# Patient Record
Sex: MALE | Race: WHITE | NOT HISPANIC OR LATINO
[De-identification: names, ages, dates, MRNs, and addresses within clinical notes are randomized per-mention and may not be internally consistent; named-entity substitution may affect disease eponyms.]

---

## 2019-02-26 ENCOUNTER — APPOINTMENT (OUTPATIENT)
Dept: SURGERY | Facility: CLINIC | Age: 69
End: 2019-02-26

## 2019-02-26 VITALS
BODY MASS INDEX: 25.33 KG/M2 | DIASTOLIC BLOOD PRESSURE: 82 MMHG | SYSTOLIC BLOOD PRESSURE: 134 MMHG | WEIGHT: 171 LBS | HEART RATE: 80 BPM | HEIGHT: 69 IN | TEMPERATURE: 97.5 F

## 2019-02-26 PROBLEM — Z00.00 ENCOUNTER FOR PREVENTIVE HEALTH EXAMINATION: Status: ACTIVE | Noted: 2019-02-26

## 2021-05-14 ENCOUNTER — RESULT REVIEW (OUTPATIENT)
Age: 71
End: 2021-05-14

## 2022-12-29 ENCOUNTER — NON-APPOINTMENT (OUTPATIENT)
Age: 72
End: 2022-12-29

## 2023-01-11 ENCOUNTER — OFFICE (OUTPATIENT)
Dept: URBAN - METROPOLITAN AREA CLINIC 122 | Facility: CLINIC | Age: 73
Setting detail: OPHTHALMOLOGY
End: 2023-01-11
Payer: COMMERCIAL

## 2023-01-11 ENCOUNTER — RX ONLY (RX ONLY)
Age: 73
End: 2023-01-11

## 2023-01-11 DIAGNOSIS — H35.40: ICD-10-CM

## 2023-01-11 DIAGNOSIS — H35.373: ICD-10-CM

## 2023-01-11 DIAGNOSIS — H26.493: ICD-10-CM

## 2023-01-11 DIAGNOSIS — Z96.1: ICD-10-CM

## 2023-01-11 DIAGNOSIS — H40.1131: ICD-10-CM

## 2023-01-11 DIAGNOSIS — H43.392: ICD-10-CM

## 2023-01-11 DIAGNOSIS — H04.123: ICD-10-CM

## 2023-01-11 PROCEDURE — 92012 INTRM OPH EXAM EST PATIENT: CPT | Performed by: OPHTHALMOLOGY

## 2023-01-11 PROCEDURE — 92083 EXTENDED VISUAL FIELD XM: CPT | Performed by: OPHTHALMOLOGY

## 2023-01-11 ASSESSMENT — REFRACTION_AUTOREFRACTION
OD_SPHERE: +1.00
OS_CYLINDER: -0.50
OS_AXIS: 51
OD_CYLINDER: -0.75
OD_AXIS: 135
OS_SPHERE: -1.75

## 2023-01-11 ASSESSMENT — REFRACTION_MANIFEST
OS_SPHERE: -8.25
OD_SPHERE: -7.50
OD_AXIS: 175
OS_AXIS: 110
OS_AXIS: 15
OS_VA1: 20/25
OS_ADD: +2.75
OS_CYLINDER: -0.50
OS_ADD: +2.75
OD_AXIS: 170
OS_AXIS: 15
OD_CYLINDER: -0.75
OD_CYLINDER: -0.75
OD_ADD: +2.75
OS_CYLINDER: -0.50
OS_CYLINDER: -0.50
OD_SPHERE: +1.00
OD_AXIS: 160
OD_CYLINDER: -0.75
OD_AXIS: 170
OS_VA1: 20/25-
OS_VA1: 20/25
OS_SPHERE: -2.00
OD_VA1: 20/25
OD_CYLINDER: -1.00
OS_SPHERE: -2.00
OD_VA1: 20/20
OS_AXIS: 180
OS_ADD: +2.75
OS_CYLINDER: -0.75
OD_SPHERE: +0.75
OS_SPHERE: -2.00
OD_AXIS: 100
OS_VA1: 20/40
OS_VA1: 20/20
OD_VA1: 20/20
OS_SPHERE: -1.50
OD_ADD: +2.75
OS_AXIS: 10
OD_VA1: 20/25
OS_CYLINDER: +0.50
OS_AXIS: 10
OS_SPHERE: -2.25
OD_SPHERE: +0.75
OD_SPHERE: +0.75
OD_CYLINDER: +1.00
OS_CYLINDER: -0.75
OS_VA1: 20/25
OD_ADD: +2.75

## 2023-01-11 ASSESSMENT — KERATOMETRY
OD_K2POWER_DIOPTERS: 44.50
OD_AXISANGLE_DEGREES: 86
OS_AXISANGLE_DEGREES: 79
OD_K1POWER_DIOPTERS: 43.75
OS_K1POWER_DIOPTERS: 44.25
OS_K2POWER_DIOPTERS: 45.00

## 2023-01-11 ASSESSMENT — TEAR BREAK UP TIME (TBUT)
OS_TBUT: 2+
OD_TBUT: 2+

## 2023-01-11 ASSESSMENT — REFRACTION_CURRENTRX
OS_CYLINDER: -0.75
OD_CYLINDER: -0.75
OD_SPHERE: +0.75
OS_AXIS: 15
OD_ADD: +2.75
OD_VPRISM_DIRECTION: PROGS
OS_SPHERE: -2.00
OS_ADD: +2.75
OS_VPRISM_DIRECTION: PROGS
OD_OVR_VA: 20/
OS_OVR_VA: 20/
OD_AXIS: 170

## 2023-01-11 ASSESSMENT — SPHEQUIV_DERIVED
OS_SPHEQUIV: -2.25
OS_SPHEQUIV: -2.5
OS_SPHEQUIV: -1.875
OD_SPHEQUIV: 0.375
OD_SPHEQUIV: 0.25
OS_SPHEQUIV: -2.25
OD_SPHEQUIV: 0.625
OS_SPHEQUIV: -2.375
OD_SPHEQUIV: 0.375
OD_SPHEQUIV: -7
OS_SPHEQUIV: -2
OD_SPHEQUIV: 0.625
OS_SPHEQUIV: -8

## 2023-01-11 ASSESSMENT — AXIALLENGTH_DERIVED
OD_AL: 23.1281
OS_AL: 24.0646
OS_AL: 26.6455
OD_AL: 23.2694
OS_AL: 24.1663
OS_AL: 24.0646
OD_AL: 23.2221
OD_AL: 23.1281
OS_AL: 23.9135
OS_AL: 23.9637
OS_AL: 24.1154
OD_AL: 23.2221
OD_AL: 26.3853

## 2023-01-11 ASSESSMENT — CONFRONTATIONAL VISUAL FIELD TEST (CVF)
OS_FINDINGS: FULL
OD_FINDINGS: FULL

## 2023-01-11 ASSESSMENT — VISUAL ACUITY
OS_BCVA: 20/25-2
OD_BCVA: 20/25-

## 2023-01-11 ASSESSMENT — PACHYMETRY
OD_CT_CORRECTION: -4
OS_CT_UM: 598
OD_CT_UM: 608
OS_CT_CORRECTION: -4

## 2023-01-11 ASSESSMENT — TONOMETRY
OS_IOP_MMHG: 21
OD_IOP_MMHG: 18

## 2023-04-04 PROBLEM — Z12.83 SCREENING EXAM FOR SKIN CANCER: Status: RESOLVED | Noted: 2023-04-04 | Resolved: 2023-04-14

## 2023-04-04 PROBLEM — D22.9 MULTIPLE BENIGN NEVI: Status: ACTIVE | Noted: 2023-04-04

## 2023-04-04 PROBLEM — L81.4 LENTIGINES: Status: ACTIVE | Noted: 2023-04-04

## 2023-04-05 ENCOUNTER — APPOINTMENT (OUTPATIENT)
Dept: DERMATOLOGY | Facility: CLINIC | Age: 73
End: 2023-04-05

## 2023-04-05 DIAGNOSIS — Z12.83 ENCOUNTER FOR SCREENING FOR MALIGNANT NEOPLASM OF SKIN: ICD-10-CM

## 2023-04-05 DIAGNOSIS — L81.4 OTHER MELANIN HYPERPIGMENTATION: ICD-10-CM

## 2023-04-05 DIAGNOSIS — D22.9 MELANOCYTIC NEVI, UNSPECIFIED: ICD-10-CM

## 2023-05-10 ENCOUNTER — RX ONLY (RX ONLY)
Age: 73
End: 2023-05-10

## 2023-05-10 ENCOUNTER — OFFICE (OUTPATIENT)
Dept: URBAN - METROPOLITAN AREA CLINIC 122 | Facility: CLINIC | Age: 73
Setting detail: OPHTHALMOLOGY
End: 2023-05-10
Payer: MEDICARE

## 2023-05-10 DIAGNOSIS — H40.1131: ICD-10-CM

## 2023-05-10 DIAGNOSIS — Z96.1: ICD-10-CM

## 2023-05-10 DIAGNOSIS — H01.131: ICD-10-CM

## 2023-05-10 DIAGNOSIS — H16.223: ICD-10-CM

## 2023-05-10 PROCEDURE — 92012 INTRM OPH EXAM EST PATIENT: CPT | Performed by: OPHTHALMOLOGY

## 2023-05-10 PROCEDURE — 92133 CPTRZD OPH DX IMG PST SGM ON: CPT | Performed by: OPHTHALMOLOGY

## 2023-05-10 ASSESSMENT — PACHYMETRY
OS_CT_CORRECTION: -4
OS_CT_UM: 598
OD_CT_UM: 608
OD_CT_CORRECTION: -4

## 2023-05-10 ASSESSMENT — REFRACTION_MANIFEST
OS_VA1: 20/20
OS_SPHERE: -2.00
OS_CYLINDER: -0.75
OD_VA1: 20/25
OD_AXIS: 170
OS_CYLINDER: -0.75
OD_ADD: +2.75
OD_VA1: 20/20
OS_VA1: 20/25
OD_SPHERE: -7.50
OS_VA1: 20/25-
OD_CYLINDER: -0.75
OD_VA1: 20/20
OS_VA1: 20/25
OS_CYLINDER: +0.50
OD_AXIS: 160
OD_CYLINDER: -0.75
OS_AXIS: 10
OD_AXIS: 175
OD_CYLINDER: +1.00
OS_ADD: +2.75
OD_ADD: +2.75
OS_CYLINDER: -0.50
OS_SPHERE: -2.00
OS_VA1: 20/25
OS_AXIS: 15
OS_ADD: +2.75
OD_CYLINDER: -1.00
OS_SPHERE: -1.50
OS_CYLINDER: -0.50
OD_SPHERE: +0.75
OS_ADD: +2.75
OS_VA1: 20/40
OS_AXIS: 15
OD_SPHERE: +0.75
OS_AXIS: 10
OD_AXIS: 170
OD_AXIS: 100
OS_CYLINDER: -0.50
OS_SPHERE: -8.25
OD_SPHERE: +0.75
OD_ADD: +2.75
OS_SPHERE: -2.00
OS_SPHERE: -2.25
OS_AXIS: 180
OD_VA1: 20/25
OD_SPHERE: +1.00
OS_AXIS: 110
OD_CYLINDER: -0.75

## 2023-05-10 ASSESSMENT — AXIALLENGTH_DERIVED
OD_AL: 23.2221
OD_AL: 23.1281
OD_AL: 23.1281
OS_AL: 24.0646
OS_AL: 24.0646
OS_AL: 24.1663
OS_AL: 24.1154
OS_AL: 26.6455
OD_AL: 26.3853
OS_AL: 23.9637
OD_AL: 23.2221
OD_AL: 23.2694
OS_AL: 23.9135

## 2023-05-10 ASSESSMENT — SPHEQUIV_DERIVED
OD_SPHEQUIV: 0.375
OD_SPHEQUIV: 0.625
OS_SPHEQUIV: -2.25
OD_SPHEQUIV: 0.375
OS_SPHEQUIV: -2.5
OS_SPHEQUIV: -2
OS_SPHEQUIV: -2.25
OS_SPHEQUIV: -1.875
OS_SPHEQUIV: -8
OD_SPHEQUIV: 0.625
OS_SPHEQUIV: -2.375
OD_SPHEQUIV: -7
OD_SPHEQUIV: 0.25

## 2023-05-10 ASSESSMENT — REFRACTION_CURRENTRX
OS_CYLINDER: -0.75
OS_ADD: +2.75
OS_VPRISM_DIRECTION: PROGS
OD_OVR_VA: 20/
OD_CYLINDER: -0.75
OD_SPHERE: +0.75
OS_AXIS: 15
OD_ADD: +2.75
OD_VPRISM_DIRECTION: PROGS
OS_OVR_VA: 20/
OS_SPHERE: -2.00
OD_AXIS: 170

## 2023-05-10 ASSESSMENT — VISUAL ACUITY
OD_BCVA: 20/25+
OS_BCVA: 20/30

## 2023-05-10 ASSESSMENT — REFRACTION_AUTOREFRACTION
OD_CYLINDER: -0.75
OS_AXIS: 51
OS_SPHERE: -1.75
OS_CYLINDER: -0.50
OD_AXIS: 135
OD_SPHERE: +1.00

## 2023-05-10 ASSESSMENT — KERATOMETRY
OD_K1POWER_DIOPTERS: 43.75
OS_K2POWER_DIOPTERS: 45.00
OS_K1POWER_DIOPTERS: 44.25
OD_K2POWER_DIOPTERS: 44.50
OD_AXISANGLE_DEGREES: 86
OS_AXISANGLE_DEGREES: 79

## 2023-05-10 ASSESSMENT — TEAR BREAK UP TIME (TBUT)
OS_TBUT: 2+
OD_TBUT: 2+

## 2023-05-10 ASSESSMENT — TONOMETRY
OD_IOP_MMHG: 19
OS_IOP_MMHG: 18

## 2023-05-10 ASSESSMENT — LID EXAM ASSESSMENTS: OD_COMMENTS: ERYTHEMA RUL

## 2023-06-09 ENCOUNTER — OFFICE (OUTPATIENT)
Dept: URBAN - METROPOLITAN AREA CLINIC 122 | Facility: CLINIC | Age: 73
Setting detail: OPHTHALMOLOGY
End: 2023-06-09
Payer: MEDICARE

## 2023-06-09 DIAGNOSIS — H16.223: ICD-10-CM

## 2023-06-09 DIAGNOSIS — Z96.1: ICD-10-CM

## 2023-06-09 DIAGNOSIS — H52.7: ICD-10-CM

## 2023-06-09 DIAGNOSIS — H40.1131: ICD-10-CM

## 2023-06-09 DIAGNOSIS — H01.131: ICD-10-CM

## 2023-06-09 DIAGNOSIS — H52.4: ICD-10-CM

## 2023-06-09 PROCEDURE — 92083 EXTENDED VISUAL FIELD XM: CPT | Performed by: OPHTHALMOLOGY

## 2023-06-09 PROCEDURE — 92015 DETERMINE REFRACTIVE STATE: CPT | Performed by: OPHTHALMOLOGY

## 2023-06-09 PROCEDURE — 92012 INTRM OPH EXAM EST PATIENT: CPT | Performed by: OPHTHALMOLOGY

## 2023-06-09 PROCEDURE — 92133 CPTRZD OPH DX IMG PST SGM ON: CPT | Performed by: OPHTHALMOLOGY

## 2023-06-09 ASSESSMENT — SPHEQUIV_DERIVED
OD_SPHEQUIV: 0.625
OS_SPHEQUIV: -2.25
OS_SPHEQUIV: -2.375
OS_SPHEQUIV: -8
OS_SPHEQUIV: -2
OD_SPHEQUIV: 0.375
OS_SPHEQUIV: -1.875
OD_SPHEQUIV: 0.625
OS_SPHEQUIV: -2.5
OD_SPHEQUIV: -7
OS_SPHEQUIV: -2.375
OD_SPHEQUIV: 0.25
OD_SPHEQUIV: 0.25

## 2023-06-09 ASSESSMENT — REFRACTION_MANIFEST
OD_AXIS: 170
OD_VA1: 20/25
OS_VA1: 20/40
OS_VA1: 20/20
OD_CYLINDER: -0.75
OS_VA1: 20/25
OD_AXIS: 100
OS_SPHERE: -2.00
OS_SPHERE: -2.00
OS_ADD: +2.75
OS_VA1: 20/25
OD_VA1: 20/25
OS_CYLINDER: +0.50
OD_CYLINDER: -1.00
OS_CYLINDER: -0.50
OD_AXIS: 170
OD_SPHERE: +1.00
OS_SPHERE: -1.50
OD_CYLINDER: -0.75
OD_CYLINDER: +1.00
OS_ADD: +2.75
OD_VA1: 20/25
OS_VA1: 20/20
OS_CYLINDER: -0.75
OS_ADD: +2.75
OS_VA1: 20/25-
OS_SPHERE: -2.00
OD_SPHERE: +0.75
OD_SPHERE: +0.75
OS_AXIS: 180
OD_AXIS: 160
OS_ADD: +2.75
OD_SPHERE: -7.50
OD_SPHERE: +0.75
OS_AXIS: 110
OD_ADD: +2.75
OS_CYLINDER: -0.75
OS_AXIS: 15
OS_SPHERE: -8.25
OD_ADD: +2.75
OD_AXIS: 170
OS_AXIS: 15
OD_VA1: 20/20
OD_ADD: +2.75
OS_CYLINDER: -0.50
OS_AXIS: 15
OS_AXIS: 10
OS_CYLINDER: -0.75
OD_CYLINDER: -1.00
OS_SPHERE: -2.25
OD_ADD: +2.75

## 2023-06-09 ASSESSMENT — REFRACTION_CURRENTRX
OS_AXIS: 15
OD_OVR_VA: 20/
OD_AXIS: 170
OS_ADD: +2.75
OS_SPHERE: -2.00
OD_ADD: +2.75
OS_VPRISM_DIRECTION: PROGS
OD_CYLINDER: -1.00
OD_VPRISM_DIRECTION: PROGS
OD_SPHERE: +0.75
OS_OVR_VA: 20/
OS_CYLINDER: -0.75

## 2023-06-09 ASSESSMENT — AXIALLENGTH_DERIVED
OS_AL: 26.6455
OD_AL: 23.2221
OS_AL: 24.0646
OS_AL: 23.9135
OD_AL: 23.1281
OD_AL: 23.2694
OS_AL: 24.1154
OS_AL: 23.9637
OS_AL: 24.1154
OD_AL: 23.1281
OS_AL: 24.1663
OD_AL: 23.2694
OD_AL: 26.3853

## 2023-06-09 ASSESSMENT — REFRACTION_AUTOREFRACTION
OS_SPHERE: -1.75
OD_SPHERE: +1.00
OS_AXIS: 51
OD_CYLINDER: -0.75
OD_AXIS: 135
OS_CYLINDER: -0.50

## 2023-06-09 ASSESSMENT — TONOMETRY
OD_IOP_MMHG: 17
OS_IOP_MMHG: 19

## 2023-06-09 ASSESSMENT — KERATOMETRY
OD_AXISANGLE_DEGREES: 86
OD_K1POWER_DIOPTERS: 43.75
OS_AXISANGLE_DEGREES: 79
OD_K2POWER_DIOPTERS: 44.50
OS_K2POWER_DIOPTERS: 45.00
OS_K1POWER_DIOPTERS: 44.25

## 2023-06-09 ASSESSMENT — LID EXAM ASSESSMENTS: OD_COMMENTS: ERYTHEMA RUL

## 2023-06-09 ASSESSMENT — TEAR BREAK UP TIME (TBUT)
OD_TBUT: 2+
OS_TBUT: 2+

## 2023-06-09 ASSESSMENT — PACHYMETRY
OS_CT_UM: 598
OD_CT_CORRECTION: -4
OS_CT_CORRECTION: -4
OD_CT_UM: 608

## 2023-06-09 ASSESSMENT — VISUAL ACUITY
OS_BCVA: 20/25-
OD_BCVA: 20/20-

## 2023-06-09 ASSESSMENT — CONFRONTATIONAL VISUAL FIELD TEST (CVF)
OS_FINDINGS: FULL
OD_FINDINGS: FULL

## 2023-10-11 ENCOUNTER — RX ONLY (RX ONLY)
Age: 73
End: 2023-10-11

## 2023-10-11 ENCOUNTER — OFFICE (OUTPATIENT)
Dept: URBAN - METROPOLITAN AREA CLINIC 122 | Facility: CLINIC | Age: 73
Setting detail: OPHTHALMOLOGY
End: 2023-10-11
Payer: MEDICARE

## 2023-10-11 DIAGNOSIS — H35.373: ICD-10-CM

## 2023-10-11 DIAGNOSIS — H01.004: ICD-10-CM

## 2023-10-11 DIAGNOSIS — H35.40: ICD-10-CM

## 2023-10-11 DIAGNOSIS — H16.223: ICD-10-CM

## 2023-10-11 DIAGNOSIS — H26.493: ICD-10-CM

## 2023-10-11 DIAGNOSIS — Z96.1: ICD-10-CM

## 2023-10-11 DIAGNOSIS — H43.392: ICD-10-CM

## 2023-10-11 DIAGNOSIS — H40.1131: ICD-10-CM

## 2023-10-11 DIAGNOSIS — H01.001: ICD-10-CM

## 2023-10-11 PROBLEM — H02.821 LID LESION; RIGHT UPPER LID: Status: ACTIVE | Noted: 2023-10-11

## 2023-10-11 PROCEDURE — 92250 FUNDUS PHOTOGRAPHY W/I&R: CPT | Performed by: OPHTHALMOLOGY

## 2023-10-11 PROCEDURE — 92014 COMPRE OPH EXAM EST PT 1/>: CPT | Performed by: OPHTHALMOLOGY

## 2023-10-11 ASSESSMENT — REFRACTION_MANIFEST
OS_CYLINDER: -0.50
OS_ADD: +2.75
OD_AXIS: 170
OS_VA1: 20/25
OS_CYLINDER: -0.75
OD_SPHERE: +0.75
OD_VA1: 20/25
OS_VA1: 20/20
OS_VA1: 20/25-
OD_SPHERE: +0.75
OS_VA1: 20/40
OD_ADD: +2.75
OD_ADD: +2.75
OD_AXIS: 170
OS_CYLINDER: -0.50
OS_ADD: +2.75
OD_CYLINDER: -0.75
OD_ADD: +2.75
OS_VA1: 20/20
OD_VA1: 20/25
OD_AXIS: 170
OS_AXIS: 10
OD_VA1: 20/25
OS_CYLINDER: +0.50
OS_SPHERE: -2.25
OS_AXIS: 15
OD_CYLINDER: +1.00
OS_SPHERE: -8.25
OS_SPHERE: -2.00
OS_CYLINDER: -0.75
OS_SPHERE: -2.00
OS_ADD: +2.75
OD_SPHERE: +1.00
OD_VA1: 20/20
OS_AXIS: 15
OS_AXIS: 15
OD_CYLINDER: -1.00
OS_ADD: +2.75
OD_SPHERE: -7.50
OS_SPHERE: -1.50
OS_SPHERE: -2.00
OS_AXIS: 180
OD_CYLINDER: -1.00
OD_ADD: +2.75
OS_AXIS: 110
OS_CYLINDER: -0.75
OD_SPHERE: +0.75
OD_CYLINDER: -0.75
OD_AXIS: 100
OD_AXIS: 160
OS_VA1: 20/25

## 2023-10-11 ASSESSMENT — LID EXAM ASSESSMENTS
OS_BLEPHARITIS: LUL 2+
OD_BLEPHARITIS: RUL 2+

## 2023-10-11 ASSESSMENT — REFRACTION_CURRENTRX
OD_VPRISM_DIRECTION: PROGS
OS_CYLINDER: -0.75
OD_OVR_VA: 20/
OS_SPHERE: -2.00
OS_OVR_VA: 20/
OS_ADD: +2.75
OD_SPHERE: +0.75
OS_AXIS: 15
OD_ADD: +2.75
OD_CYLINDER: -1.00
OS_VPRISM_DIRECTION: PROGS
OD_AXIS: 170

## 2023-10-11 ASSESSMENT — SPHEQUIV_DERIVED
OD_SPHEQUIV: 0.625
OS_SPHEQUIV: -2.5
OD_SPHEQUIV: -7
OS_SPHEQUIV: -1.875
OS_SPHEQUIV: -2
OS_SPHEQUIV: -2.375
OD_SPHEQUIV: 0.25
OD_SPHEQUIV: 0.625
OS_SPHEQUIV: -2.25
OD_SPHEQUIV: 0.375
OD_SPHEQUIV: 0.25
OS_SPHEQUIV: -8
OS_SPHEQUIV: -2.375

## 2023-10-11 ASSESSMENT — KERATOMETRY
OS_K1POWER_DIOPTERS: 44.25
OS_K2POWER_DIOPTERS: 45.00
OD_K2POWER_DIOPTERS: 44.50
OS_AXISANGLE_DEGREES: 79
OD_K1POWER_DIOPTERS: 43.75
OD_AXISANGLE_DEGREES: 86

## 2023-10-11 ASSESSMENT — TONOMETRY
OD_IOP_MMHG: 16
OS_IOP_MMHG: 17

## 2023-10-11 ASSESSMENT — REFRACTION_AUTOREFRACTION
OS_SPHERE: -1.75
OD_AXIS: 135
OS_AXIS: 51
OD_CYLINDER: -0.75
OD_SPHERE: +1.00
OS_CYLINDER: -0.50

## 2023-10-11 ASSESSMENT — AXIALLENGTH_DERIVED
OD_AL: 26.3853
OS_AL: 24.1154
OS_AL: 24.1663
OD_AL: 23.2694
OS_AL: 24.0646
OD_AL: 23.2694
OD_AL: 23.2221
OS_AL: 26.6455
OS_AL: 23.9637
OS_AL: 23.9135
OS_AL: 24.1154
OD_AL: 23.1281
OD_AL: 23.1281

## 2023-10-11 ASSESSMENT — PACHYMETRY
OS_CT_CORRECTION: -4
OD_CT_CORRECTION: -4
OD_CT_UM: 608
OS_CT_UM: 598

## 2023-10-11 ASSESSMENT — VISUAL ACUITY
OD_BCVA: 20/25
OS_BCVA: 20/30

## 2023-10-11 ASSESSMENT — TEAR BREAK UP TIME (TBUT)
OS_TBUT: 2+
OD_TBUT: 2+

## 2023-10-20 ENCOUNTER — APPOINTMENT (OUTPATIENT)
Dept: PEDIATRIC ORTHOPEDIC SURGERY | Facility: CLINIC | Age: 73
End: 2023-10-20
Payer: MEDICARE

## 2023-10-20 VITALS — HEIGHT: 68 IN | WEIGHT: 170 LBS | BODY MASS INDEX: 25.76 KG/M2

## 2023-10-20 VITALS — TEMPERATURE: 96.7 F | SYSTOLIC BLOOD PRESSURE: 137 MMHG | DIASTOLIC BLOOD PRESSURE: 90 MMHG

## 2023-10-20 DIAGNOSIS — M19.012 PRIMARY OSTEOARTHRITIS, LEFT SHOULDER: ICD-10-CM

## 2023-10-20 DIAGNOSIS — M23.8X2 OTHER INTERNAL DERANGEMENTS OF LEFT KNEE: ICD-10-CM

## 2023-10-20 DIAGNOSIS — M25.812 OTHER SPECIFIED JOINT DISORDERS, LEFT SHOULDER: ICD-10-CM

## 2023-10-20 DIAGNOSIS — Z82.49 FAMILY HISTORY OF ISCHEMIC HEART DISEASE AND OTHER DISEASES OF THE CIRCULATORY SYSTEM: ICD-10-CM

## 2023-10-20 PROCEDURE — 73562 X-RAY EXAM OF KNEE 3: CPT

## 2023-10-20 PROCEDURE — 73030 X-RAY EXAM OF SHOULDER: CPT

## 2023-10-20 PROCEDURE — 99203 OFFICE O/P NEW LOW 30 MIN: CPT

## 2023-10-20 RX ORDER — ROSUVASTATIN CALCIUM 5 MG/1
TABLET, FILM COATED ORAL
Refills: 0 | Status: ACTIVE | COMMUNITY

## 2023-10-20 RX ORDER — DULOXETINE HYDROCHLORIDE 30 MG/1
CAPSULE, DELAYED RELEASE ORAL
Refills: 0 | Status: ACTIVE | COMMUNITY

## 2024-02-07 ENCOUNTER — OFFICE (OUTPATIENT)
Dept: URBAN - METROPOLITAN AREA CLINIC 122 | Facility: CLINIC | Age: 74
Setting detail: OPHTHALMOLOGY
End: 2024-02-07
Payer: MEDICARE

## 2024-02-07 DIAGNOSIS — H16.223: ICD-10-CM

## 2024-02-07 DIAGNOSIS — H26.493: ICD-10-CM

## 2024-02-07 DIAGNOSIS — H01.001: ICD-10-CM

## 2024-02-07 DIAGNOSIS — H35.373: ICD-10-CM

## 2024-02-07 DIAGNOSIS — H40.1131: ICD-10-CM

## 2024-02-07 DIAGNOSIS — Z96.1: ICD-10-CM

## 2024-02-07 DIAGNOSIS — H02.821: ICD-10-CM

## 2024-02-07 DIAGNOSIS — H43.392: ICD-10-CM

## 2024-02-07 DIAGNOSIS — H01.004: ICD-10-CM

## 2024-02-07 DIAGNOSIS — H35.40: ICD-10-CM

## 2024-02-07 PROCEDURE — 99214 OFFICE O/P EST MOD 30 MIN: CPT | Performed by: OPHTHALMOLOGY

## 2024-02-07 PROCEDURE — 92134 CPTRZ OPH DX IMG PST SGM RTA: CPT | Performed by: OPHTHALMOLOGY

## 2024-02-07 ASSESSMENT — REFRACTION_MANIFEST
OS_ADD: +2.75
OD_ADD: +2.75
OD_VA1: 20/25
OD_SPHERE: +0.75
OS_AXIS: 15
OS_VA1: 20/25
OD_CYLINDER: +1.00
OS_CYLINDER: -0.50
OS_ADD: +2.75
OS_SPHERE: -2.00
OS_VA1: 20/20
OD_SPHERE: -7.50
OS_SPHERE: -2.00
OD_SPHERE: +0.75
OD_CYLINDER: -0.75
OD_AXIS: 170
OS_AXIS: 180
OD_SPHERE: +0.75
OD_CYLINDER: -1.00
OD_VA1: 20/25
OS_ADD: +2.75
OD_CYLINDER: -1.00
OD_AXIS: 170
OD_VA1: 20/25
OD_AXIS: 170
OS_ADD: +2.75
OS_VA1: 20/20
OS_VA1: 20/25
OS_AXIS: 15
OS_CYLINDER: -0.50
OS_SPHERE: -8.25
OS_VA1: 20/40
OD_AXIS: 160
OS_SPHERE: -2.25
OS_AXIS: 110
OS_CYLINDER: -0.75
OS_CYLINDER: -0.75
OD_AXIS: 100
OD_ADD: +2.75
OS_VA1: 20/25-
OD_ADD: +2.75
OS_CYLINDER: -0.75
OS_AXIS: 10
OD_SPHERE: +1.00
OD_ADD: +2.75
OS_SPHERE: -2.00
OS_AXIS: 15
OS_SPHERE: -1.50
OD_VA1: 20/20
OS_CYLINDER: +0.50
OD_CYLINDER: -0.75

## 2024-02-07 ASSESSMENT — REFRACTION_AUTOREFRACTION
OS_AXIS: 51
OS_SPHERE: -1.75
OD_CYLINDER: -0.75
OD_SPHERE: +1.00
OD_AXIS: 135
OS_CYLINDER: -0.50

## 2024-02-07 ASSESSMENT — SPHEQUIV_DERIVED
OD_SPHEQUIV: 0.25
OS_SPHEQUIV: -2.375
OD_SPHEQUIV: 0.375
OS_SPHEQUIV: -2.375
OD_SPHEQUIV: 0.625
OD_SPHEQUIV: 0.25
OD_SPHEQUIV: 0.625
OS_SPHEQUIV: -2.5
OS_SPHEQUIV: -1.875
OS_SPHEQUIV: -2.25
OS_SPHEQUIV: -2
OD_SPHEQUIV: -7
OS_SPHEQUIV: -8

## 2024-02-07 ASSESSMENT — REFRACTION_CURRENTRX
OD_SPHERE: +0.75
OS_VPRISM_DIRECTION: PROGS
OS_CYLINDER: -0.75
OS_ADD: +2.75
OD_AXIS: 170
OS_OVR_VA: 20/
OD_VPRISM_DIRECTION: PROGS
OD_OVR_VA: 20/
OD_CYLINDER: -1.00
OS_AXIS: 15
OD_ADD: +2.75
OS_SPHERE: -2.00

## 2024-02-07 ASSESSMENT — CONFRONTATIONAL VISUAL FIELD TEST (CVF)
OD_FINDINGS: FULL
OS_FINDINGS: FULL

## 2024-02-07 ASSESSMENT — LID EXAM ASSESSMENTS
OS_BLEPHARITIS: LUL 2+
OD_BLEPHARITIS: RUL 2+

## 2024-02-07 ASSESSMENT — TEAR BREAK UP TIME (TBUT)
OD_TBUT: 2+
OS_TBUT: 2+

## 2024-02-28 ENCOUNTER — RX ONLY (RX ONLY)
Age: 74
End: 2024-02-28

## 2024-02-28 ENCOUNTER — OFFICE (OUTPATIENT)
Dept: URBAN - METROPOLITAN AREA CLINIC 122 | Facility: CLINIC | Age: 74
Setting detail: OPHTHALMOLOGY
End: 2024-02-28
Payer: MEDICARE

## 2024-02-28 DIAGNOSIS — H26.491: ICD-10-CM

## 2024-02-28 PROBLEM — H26.492 POSTERIOR CAPSULAR OPACIFICATION; RIGHT EYE, LEFT EYE, BOTH EYES: Status: ACTIVE | Noted: 2024-02-28

## 2024-02-28 PROBLEM — H26.493 POSTERIOR CAPSULAR OPACIFICATION; RIGHT EYE, LEFT EYE, BOTH EYES: Status: ACTIVE | Noted: 2024-02-28

## 2024-02-28 PROCEDURE — 66821 AFTER CATARACT LASER SURGERY: CPT | Mod: RT | Performed by: OPHTHALMOLOGY

## 2024-02-28 ASSESSMENT — REFRACTION_MANIFEST
OS_VA1: 20/40
OS_SPHERE: -2.00
OD_VA1: 20/25
OD_CYLINDER: -1.00
OD_SPHERE: +0.75
OS_VA1: 20/25-
OD_SPHERE: +1.00
OS_VA1: 20/20
OS_SPHERE: -2.00
OS_AXIS: 110
OD_AXIS: 170
OS_CYLINDER: -0.75
OD_ADD: +2.75
OS_AXIS: 15
OS_ADD: +2.75
OS_CYLINDER: -0.75
OS_SPHERE: -2.00
OS_CYLINDER: -0.50
OD_VA1: 20/20
OD_VA1: 20/25
OD_SPHERE: -7.50
OD_ADD: +2.75
OS_VA1: 20/25
OD_AXIS: 160
OS_ADD: +2.75
OS_ADD: +2.75
OS_SPHERE: -1.50
OS_AXIS: 180
OS_AXIS: 15
OD_AXIS: 170
OS_SPHERE: -2.25
OS_CYLINDER: -0.75
OS_VA1: 20/25
OD_AXIS: 170
OD_ADD: +2.75
OS_AXIS: 10
OD_CYLINDER: -1.00
OD_CYLINDER: -0.75
OD_CYLINDER: -0.75
OD_CYLINDER: +1.00
OD_ADD: +2.75
OS_CYLINDER: -0.50
OS_SPHERE: -8.25
OD_SPHERE: +0.75
OS_ADD: +2.75
OS_CYLINDER: +0.50
OS_AXIS: 15
OD_VA1: 20/25
OD_AXIS: 100
OD_SPHERE: +0.75
OS_VA1: 20/20

## 2024-02-28 ASSESSMENT — REFRACTION_CURRENTRX
OD_ADD: +2.75
OS_CYLINDER: -0.75
OD_OVR_VA: 20/
OD_CYLINDER: -1.00
OS_OVR_VA: 20/
OS_AXIS: 15
OD_SPHERE: +0.75
OS_SPHERE: -2.00
OS_ADD: +2.75
OD_VPRISM_DIRECTION: PROGS
OS_VPRISM_DIRECTION: PROGS
OD_AXIS: 170

## 2024-02-28 ASSESSMENT — SPHEQUIV_DERIVED
OS_SPHEQUIV: -2.25
OD_SPHEQUIV: 0.25
OD_SPHEQUIV: 0.625
OS_SPHEQUIV: -8
OD_SPHEQUIV: -7
OS_SPHEQUIV: -1.875
OS_SPHEQUIV: -2
OS_SPHEQUIV: -2.5
OS_SPHEQUIV: -2.375
OD_SPHEQUIV: 0.625
OS_SPHEQUIV: -2.375
OD_SPHEQUIV: 0.375
OD_SPHEQUIV: 0.25

## 2024-02-28 ASSESSMENT — REFRACTION_AUTOREFRACTION
OS_SPHERE: -1.75
OS_AXIS: 51
OD_AXIS: 135
OD_CYLINDER: -0.75
OS_CYLINDER: -0.50
OD_SPHERE: +1.00

## 2024-02-28 ASSESSMENT — TEAR BREAK UP TIME (TBUT)
OD_TBUT: 2+
OS_TBUT: 2+

## 2024-03-28 ENCOUNTER — APPOINTMENT (OUTPATIENT)
Dept: UROLOGY | Facility: CLINIC | Age: 74
End: 2024-03-28
Payer: MEDICARE

## 2024-03-28 VITALS
SYSTOLIC BLOOD PRESSURE: 123 MMHG | HEART RATE: 69 BPM | HEIGHT: 68 IN | BODY MASS INDEX: 26.98 KG/M2 | WEIGHT: 178 LBS | DIASTOLIC BLOOD PRESSURE: 74 MMHG

## 2024-03-28 DIAGNOSIS — R97.20 ELEVATED PROSTATE, SPECIFIC ANTIGEN [PSA]: ICD-10-CM

## 2024-03-28 DIAGNOSIS — Z12.5 ENCOUNTER FOR SCREENING FOR MALIGNANT NEOPLASM OF PROSTATE: ICD-10-CM

## 2024-03-28 DIAGNOSIS — N52.9 MALE ERECTILE DYSFUNCTION, UNSPECIFIED: ICD-10-CM

## 2024-03-28 PROCEDURE — 99203 OFFICE O/P NEW LOW 30 MIN: CPT

## 2024-03-28 NOTE — ASSESSMENT
[FreeTextEntry1] : Patient is a 73-year-old male who presents today for right-sided PSA.  His PCP noted a rise in PSA from about 1.6-3.3 over the last calendar year.  He denies associated hematuria, dysuria, hematospermia or pelvic pain.  He has no family history of prostate cancer.  His physical exam today is normal.  In addition, he takes sildenafil 100 mg as needed for erectile dysfunction.  Assessment and plan 1.  Rising PSA CELY is notable and will recheck PSA today.  If still in the 3 light range recommend getting an MRI of the prostate.  To evaluate for lesions and possible biopsy.  2.  Erectile dysfunction Continue sildenafil 100 mg as needed. I will call him with PSA results.

## 2024-04-01 LAB — PSA SERPL-MCNC: 1.23 NG/ML

## 2024-04-09 ENCOUNTER — OFFICE (OUTPATIENT)
Dept: URBAN - METROPOLITAN AREA CLINIC 122 | Facility: CLINIC | Age: 74
Setting detail: OPHTHALMOLOGY
End: 2024-04-09
Payer: MEDICARE

## 2024-04-09 ENCOUNTER — RX ONLY (RX ONLY)
Age: 74
End: 2024-04-09

## 2024-04-09 DIAGNOSIS — H26.492: ICD-10-CM

## 2024-04-09 PROCEDURE — 66821 AFTER CATARACT LASER SURGERY: CPT | Mod: 79,LT | Performed by: OPHTHALMOLOGY

## 2024-04-23 ENCOUNTER — OFFICE (OUTPATIENT)
Dept: URBAN - METROPOLITAN AREA CLINIC 122 | Facility: CLINIC | Age: 74
Setting detail: OPHTHALMOLOGY
End: 2024-04-23
Payer: MEDICARE

## 2024-04-23 DIAGNOSIS — H40.1131: ICD-10-CM

## 2024-04-23 DIAGNOSIS — L30.8: ICD-10-CM

## 2024-04-23 DIAGNOSIS — H01.001: ICD-10-CM

## 2024-04-23 DIAGNOSIS — H01.004: ICD-10-CM

## 2024-04-23 PROCEDURE — 99213 OFFICE O/P EST LOW 20 MIN: CPT | Mod: 24 | Performed by: OPHTHALMOLOGY

## 2024-04-23 ASSESSMENT — LID EXAM ASSESSMENTS
OD_BLEPHARITIS: RUL 2+
OS_BLEPHARITIS: LUL 2+

## 2024-05-09 ENCOUNTER — OFFICE (OUTPATIENT)
Dept: URBAN - METROPOLITAN AREA CLINIC 122 | Facility: CLINIC | Age: 74
Setting detail: OPHTHALMOLOGY
End: 2024-05-09
Payer: MEDICARE

## 2024-05-09 DIAGNOSIS — L30.8: ICD-10-CM

## 2024-05-09 DIAGNOSIS — H40.1131: ICD-10-CM

## 2024-05-09 DIAGNOSIS — H01.004: ICD-10-CM

## 2024-05-09 DIAGNOSIS — H01.001: ICD-10-CM

## 2024-05-09 PROCEDURE — 92083 EXTENDED VISUAL FIELD XM: CPT | Mod: 79 | Performed by: OPHTHALMOLOGY

## 2024-05-09 PROCEDURE — 99213 OFFICE O/P EST LOW 20 MIN: CPT | Mod: 24 | Performed by: OPHTHALMOLOGY

## 2024-05-09 PROCEDURE — 92133 CPTRZD OPH DX IMG PST SGM ON: CPT | Mod: 79 | Performed by: OPHTHALMOLOGY

## 2024-05-09 ASSESSMENT — LID EXAM ASSESSMENTS
OS_BLEPHARITIS: LUL 2+
OD_BLEPHARITIS: RUL 2+

## 2024-05-09 ASSESSMENT — CONFRONTATIONAL VISUAL FIELD TEST (CVF)
OS_FINDINGS: FULL
OD_FINDINGS: FULL

## 2024-09-13 ENCOUNTER — OFFICE (OUTPATIENT)
Dept: URBAN - METROPOLITAN AREA CLINIC 122 | Facility: CLINIC | Age: 74
Setting detail: OPHTHALMOLOGY
End: 2024-09-13
Payer: MEDICARE

## 2024-09-13 DIAGNOSIS — H01.001: ICD-10-CM

## 2024-09-13 DIAGNOSIS — H52.7: ICD-10-CM

## 2024-09-13 DIAGNOSIS — L30.8: ICD-10-CM

## 2024-09-13 DIAGNOSIS — H26.493: ICD-10-CM

## 2024-09-13 DIAGNOSIS — H40.1131: ICD-10-CM

## 2024-09-13 PROCEDURE — 92014 COMPRE OPH EXAM EST PT 1/>: CPT | Performed by: OPHTHALMOLOGY

## 2024-09-13 PROCEDURE — 92250 FUNDUS PHOTOGRAPHY W/I&R: CPT | Performed by: OPHTHALMOLOGY

## 2024-09-13 PROCEDURE — 92015 DETERMINE REFRACTIVE STATE: CPT | Performed by: OPHTHALMOLOGY

## 2024-09-13 ASSESSMENT — CONFRONTATIONAL VISUAL FIELD TEST (CVF)
OS_FINDINGS: FULL
OD_FINDINGS: FULL

## 2024-09-13 ASSESSMENT — LID EXAM ASSESSMENTS
OS_BLEPHARITIS: LUL 2+
OD_BLEPHARITIS: RUL 2+

## 2024-10-11 ENCOUNTER — OFFICE (OUTPATIENT)
Dept: URBAN - METROPOLITAN AREA CLINIC 122 | Facility: CLINIC | Age: 74
Setting detail: OPHTHALMOLOGY
End: 2024-10-11
Payer: MEDICARE

## 2024-10-11 DIAGNOSIS — H43.392: ICD-10-CM

## 2024-10-11 DIAGNOSIS — H35.373: ICD-10-CM

## 2024-10-11 DIAGNOSIS — L30.8: ICD-10-CM

## 2024-10-11 DIAGNOSIS — H35.40: ICD-10-CM

## 2024-10-11 DIAGNOSIS — H16.223: ICD-10-CM

## 2024-10-11 DIAGNOSIS — H40.1131: ICD-10-CM

## 2024-10-11 DIAGNOSIS — H26.491: ICD-10-CM

## 2024-10-11 DIAGNOSIS — Z96.1: ICD-10-CM

## 2024-10-11 DIAGNOSIS — H01.004: ICD-10-CM

## 2024-10-11 DIAGNOSIS — H26.493: ICD-10-CM

## 2024-10-11 DIAGNOSIS — H01.001: ICD-10-CM

## 2024-10-11 DIAGNOSIS — H26.492: ICD-10-CM

## 2024-10-11 PROCEDURE — 92012 INTRM OPH EXAM EST PATIENT: CPT | Performed by: OPHTHALMOLOGY

## 2024-10-11 PROCEDURE — 92133 CPTRZD OPH DX IMG PST SGM ON: CPT | Performed by: OPHTHALMOLOGY

## 2024-10-11 ASSESSMENT — REFRACTION_MANIFEST
OS_CYLINDER: +0.50
OS_AXIS: 180
OD_AXIS: 160
OS_CYLINDER: -0.75
OS_VA1: 20/25-
OS_SPHERE: -1.50
OD_VA1: 20/20
OD_SPHERE: +0.75
OS_VA1: 20/40
OD_VA1: 20/25
OD_AXIS: 170
OS_CYLINDER: -0.75
OS_AXIS: 15
OD_SPHERE: +0.75
OS_SPHERE: -2.25
OD_VA1: 20/20
OD_CYLINDER: -1.00
OD_ADD: +2.75
OS_AXIS: 10
OS_VA1: 20/25
OD_CYLINDER: -0.75
OD_CYLINDER: -0.75
OS_AXIS: 110
OS_CYLINDER: -0.75
OD_ADD: +2.75
OD_AXIS: 170
OS_CYLINDER: -0.50
OS_SPHERE: -2.00
OS_VA1: 20/20
OS_VA1: 20/20
OS_ADD: +2.75
OS_ADD: +2.75
OS_SPHERE: -1.75
OD_CYLINDER: +1.00
OS_CYLINDER: SPH
OD_AXIS: 100
OS_SPHERE: -8.25
OD_SPHERE: -7.50
OS_AXIS: 15
OS_AXIS: 15
OS_VA1: 20/20
OS_SPHERE: -2.00
OS_SPHERE: -2.00
OS_ADD: +2.75
OD_VA1: 20/25
OS_CYLINDER: -0.50
OD_CYLINDER: -0.75
OS_ADD: +2.75
OD_ADD: +2.75
OD_AXIS: 170
OS_VA1: 20/25
OD_CYLINDER: -1.00
OD_ADD: +2.75
OS_ADD: +2.75
OD_VA1: 20/25
OD_SPHERE: +0.75
OD_AXIS: 150
OD_SPHERE: +1.00
OD_ADD: +2.75
OD_SPHERE: +0.75

## 2024-10-11 ASSESSMENT — REFRACTION_AUTOREFRACTION
OS_CYLINDER: SPH
OD_AXIS: 149
OD_SPHERE: +0.75
OD_CYLINDER: -0.75
OS_SPHERE: -1.75

## 2024-10-11 ASSESSMENT — REFRACTION_CURRENTRX
OD_ADD: +2.25
OS_OVR_VA: 20/
OS_VPRISM_DIRECTION: PROGS
OS_CYLINDER: -0.50
OS_SPHERE: -1.75
OD_CYLINDER: -1.00
OS_ADD: +2.25
OD_AXIS: 170
OD_OVR_VA: 20/
OS_AXIS: 177
OD_SPHERE: +0.75
OD_VPRISM_DIRECTION: PROGS

## 2024-10-11 ASSESSMENT — KERATOMETRY
OS_K2POWER_DIOPTERS: 45.00
OD_K1POWER_DIOPTERS: 43.75
OS_AXISANGLE_DEGREES: 79
OD_K2POWER_DIOPTERS: 44.50
OS_K1POWER_DIOPTERS: 44.25
OD_AXISANGLE_DEGREES: 86

## 2024-10-11 ASSESSMENT — TEAR BREAK UP TIME (TBUT)
OS_TBUT: 2+
OD_TBUT: 2+

## 2024-10-11 ASSESSMENT — LID EXAM ASSESSMENTS
OD_BLEPHARITIS: RUL 2+
OS_BLEPHARITIS: LUL 2+

## 2024-10-11 ASSESSMENT — PACHYMETRY
OD_CT_UM: 608
OS_CT_UM: 598
OS_CT_CORRECTION: -4
OD_CT_CORRECTION: -4

## 2024-10-11 ASSESSMENT — CONFRONTATIONAL VISUAL FIELD TEST (CVF)
OS_FINDINGS: FULL
OD_FINDINGS: FULL

## 2024-10-11 ASSESSMENT — VISUAL ACUITY
OD_BCVA: 20/25+2
OS_BCVA: 20/20

## 2024-10-11 ASSESSMENT — TONOMETRY
OD_IOP_MMHG: 16
OS_IOP_MMHG: 17

## 2024-11-08 ENCOUNTER — APPOINTMENT (OUTPATIENT)
Dept: NEPHROLOGY | Facility: CLINIC | Age: 74
End: 2024-11-08
Payer: MEDICARE

## 2024-11-08 VITALS
BODY MASS INDEX: 27.28 KG/M2 | OXYGEN SATURATION: 98 % | HEART RATE: 73 BPM | WEIGHT: 180 LBS | DIASTOLIC BLOOD PRESSURE: 70 MMHG | SYSTOLIC BLOOD PRESSURE: 110 MMHG | HEIGHT: 68 IN

## 2024-11-08 DIAGNOSIS — N17.9 ACUTE KIDNEY FAILURE, UNSPECIFIED: ICD-10-CM

## 2024-11-08 PROCEDURE — 99204 OFFICE O/P NEW MOD 45 MIN: CPT

## 2024-11-11 LAB
ALBUMIN SERPL ELPH-MCNC: 4.4 G/DL
ALP BLD-CCNC: 60 U/L
ALT SERPL-CCNC: 29 U/L
ANION GAP SERPL CALC-SCNC: 11 MMOL/L
APPEARANCE: CLEAR
AST SERPL-CCNC: 44 U/L
BACTERIA: NEGATIVE /HPF
BILIRUB SERPL-MCNC: 0.3 MG/DL
BILIRUBIN URINE: NEGATIVE
BLOOD URINE: NEGATIVE
BUN SERPL-MCNC: 26 MG/DL
CALCIUM SERPL-MCNC: 9.6 MG/DL
CAST: 0 /LPF
CHLORIDE SERPL-SCNC: 105 MMOL/L
CK SERPL-CCNC: 140 U/L
CO2 SERPL-SCNC: 27 MMOL/L
COLOR: NORMAL
CREAT SERPL-MCNC: 1.43 MG/DL
CYSTATIN C SERPL-MCNC: 1.05 MG/L
EGFR: 52 ML/MIN/1.73M2
EPITHELIAL CELLS: 0 /HPF
GFR/BSA.PRED SERPLBLD CYS-BASED-ARV: 69 ML/MIN/1.73M2
GLUCOSE QUALITATIVE U: NEGATIVE MG/DL
GLUCOSE SERPL-MCNC: 95 MG/DL
HCT VFR BLD CALC: 47.9 %
HGB BLD-MCNC: 15.7 G/DL
KETONES URINE: NEGATIVE MG/DL
LEUKOCYTE ESTERASE URINE: NEGATIVE
MCHC RBC-ENTMCNC: 29.3 PG
MCHC RBC-ENTMCNC: 32.8 G/DL
MCV RBC AUTO: 89.4 FL
MICROSCOPIC-UA: NORMAL
NITRITE URINE: NEGATIVE
PH URINE: 7
PLATELET # BLD AUTO: 251 K/UL
POTASSIUM SERPL-SCNC: 4.7 MMOL/L
PROT SERPL-MCNC: 6.8 G/DL
PROTEIN URINE: NORMAL MG/DL
RBC # BLD: 5.36 M/UL
RBC # FLD: 14.6 %
RED BLOOD CELLS URINE: 0 /HPF
SODIUM SERPL-SCNC: 143 MMOL/L
SPECIFIC GRAVITY URINE: 1.02
URATE SERPL-MCNC: 8.5 MG/DL
UROBILINOGEN URINE: 0.2 MG/DL
WBC # FLD AUTO: 6.56 K/UL
WHITE BLOOD CELLS URINE: 2 /HPF

## 2024-11-22 ENCOUNTER — TRANSCRIPTION ENCOUNTER (OUTPATIENT)
Age: 74
End: 2024-11-22

## 2024-12-26 ENCOUNTER — RESULT REVIEW (OUTPATIENT)
Age: 74
End: 2024-12-26

## 2024-12-30 ENCOUNTER — APPOINTMENT (OUTPATIENT)
Dept: NEPHROLOGY | Facility: CLINIC | Age: 74
End: 2024-12-30
Payer: MEDICARE

## 2024-12-30 ENCOUNTER — RESULT REVIEW (OUTPATIENT)
Age: 74
End: 2024-12-30

## 2024-12-30 DIAGNOSIS — Z00.00 ENCOUNTER FOR GENERAL ADULT MEDICAL EXAMINATION W/OUT ABNORMAL FINDINGS: ICD-10-CM

## 2024-12-30 DIAGNOSIS — Z12.5 ENCOUNTER FOR SCREENING FOR MALIGNANT NEOPLASM OF PROSTATE: ICD-10-CM

## 2024-12-30 DIAGNOSIS — R97.20 ELEVATED PROSTATE, SPECIFIC ANTIGEN [PSA]: ICD-10-CM

## 2024-12-30 PROCEDURE — 36415 COLL VENOUS BLD VENIPUNCTURE: CPT

## 2025-01-06 ENCOUNTER — APPOINTMENT (OUTPATIENT)
Dept: NEPHROLOGY | Facility: CLINIC | Age: 75
End: 2025-01-06
Payer: MEDICARE

## 2025-01-06 VITALS
HEART RATE: 74 BPM | DIASTOLIC BLOOD PRESSURE: 70 MMHG | SYSTOLIC BLOOD PRESSURE: 122 MMHG | BODY MASS INDEX: 28.19 KG/M2 | HEIGHT: 68 IN | WEIGHT: 186 LBS | OXYGEN SATURATION: 98 %

## 2025-01-06 DIAGNOSIS — N17.9 ACUTE KIDNEY FAILURE, UNSPECIFIED: ICD-10-CM

## 2025-01-06 PROCEDURE — 99213 OFFICE O/P EST LOW 20 MIN: CPT

## 2025-02-05 ENCOUNTER — OFFICE (OUTPATIENT)
Dept: URBAN - METROPOLITAN AREA CLINIC 122 | Facility: CLINIC | Age: 75
Setting detail: OPHTHALMOLOGY
End: 2025-02-05
Payer: MEDICARE

## 2025-02-05 DIAGNOSIS — H35.40: ICD-10-CM

## 2025-02-05 DIAGNOSIS — Z96.1: ICD-10-CM

## 2025-02-05 DIAGNOSIS — H26.493: ICD-10-CM

## 2025-02-05 DIAGNOSIS — H40.1131: ICD-10-CM

## 2025-02-05 DIAGNOSIS — H01.001: ICD-10-CM

## 2025-02-05 DIAGNOSIS — H26.491: ICD-10-CM

## 2025-02-05 DIAGNOSIS — H01.004: ICD-10-CM

## 2025-02-05 DIAGNOSIS — H26.492: ICD-10-CM

## 2025-02-05 DIAGNOSIS — H16.223: ICD-10-CM

## 2025-02-05 DIAGNOSIS — L30.8: ICD-10-CM

## 2025-02-05 PROCEDURE — 92083 EXTENDED VISUAL FIELD XM: CPT | Performed by: OPHTHALMOLOGY

## 2025-02-05 PROCEDURE — 92012 INTRM OPH EXAM EST PATIENT: CPT | Performed by: OPHTHALMOLOGY

## 2025-02-05 ASSESSMENT — REFRACTION_MANIFEST
OD_CYLINDER: +1.00
OS_VA1: 20/25
OD_ADD: +2.75
OS_AXIS: 180
OD_AXIS: 170
OD_SPHERE: +1.00
OD_ADD: +2.75
OS_ADD: +2.75
OS_ADD: +2.75
OS_CYLINDER: +0.50
OS_ADD: +2.75
OS_CYLINDER: -0.75
OD_CYLINDER: -0.75
OS_AXIS: 15
OD_CYLINDER: -0.75
OS_SPHERE: -2.25
OD_AXIS: 160
OD_SPHERE: -7.50
OS_ADD: +2.75
OS_VA1: 20/40
OS_AXIS: 110
OS_SPHERE: -2.00
OS_AXIS: 10
OS_VA1: 20/25
OD_VA1: 20/25
OD_SPHERE: +0.75
OS_VA1: 20/20
OD_AXIS: 150
OD_SPHERE: +0.75
OD_VA1: 20/20
OS_SPHERE: -2.00
OS_CYLINDER: -0.75
OS_VA1: 20/20
OS_SPHERE: -1.75
OS_SPHERE: -8.25
OD_AXIS: 100
OS_CYLINDER: -0.50
OS_VA1: 20/25-
OD_VA1: 20/25
OD_CYLINDER: -1.00
OD_VA1: 20/25
OS_ADD: +2.75
OD_CYLINDER: -1.00
OD_ADD: +2.75
OS_CYLINDER: SPH
OS_VA1: 20/20
OS_SPHERE: -1.50
OD_SPHERE: +0.75
OS_CYLINDER: -0.75
OS_CYLINDER: -0.50
OD_VA1: 20/20
OS_AXIS: 15
OD_ADD: +2.75
OD_ADD: +2.75
OD_CYLINDER: -0.75
OD_SPHERE: +0.75
OS_AXIS: 15
OS_SPHERE: -2.00

## 2025-02-05 ASSESSMENT — REFRACTION_CURRENTRX
OD_AXIS: 170
OD_VPRISM_DIRECTION: PROGS
OD_OVR_VA: 20/
OS_AXIS: 177
OS_CYLINDER: -0.50
OS_ADD: +2.25
OS_VPRISM_DIRECTION: PROGS
OD_CYLINDER: -1.00
OS_SPHERE: -1.75
OD_ADD: +2.25
OD_SPHERE: +0.75
OS_OVR_VA: 20/

## 2025-02-05 ASSESSMENT — LID EXAM ASSESSMENTS
OS_BLEPHARITIS: LUL 2+
OD_BLEPHARITIS: RUL 2+

## 2025-02-05 ASSESSMENT — REFRACTION_AUTOREFRACTION
OD_CYLINDER: -0.75
OD_SPHERE: +0.75
OD_AXIS: 149
OS_CYLINDER: SPH
OS_SPHERE: -1.75

## 2025-02-05 ASSESSMENT — PACHYMETRY
OS_CT_UM: 598
OD_CT_CORRECTION: -4
OS_CT_CORRECTION: -4
OD_CT_UM: 608

## 2025-02-05 ASSESSMENT — KERATOMETRY
OD_K2POWER_DIOPTERS: 44.50
OS_K2POWER_DIOPTERS: 45.00
OS_AXISANGLE_DEGREES: 79
OD_K1POWER_DIOPTERS: 43.75
OS_K1POWER_DIOPTERS: 44.25
OD_AXISANGLE_DEGREES: 86

## 2025-02-05 ASSESSMENT — VISUAL ACUITY
OD_BCVA: 20/25+2
OS_BCVA: 20/20

## 2025-02-05 ASSESSMENT — TEAR BREAK UP TIME (TBUT)
OD_TBUT: 2+
OS_TBUT: 2+

## 2025-04-22 ENCOUNTER — OFFICE (OUTPATIENT)
Dept: URBAN - METROPOLITAN AREA CLINIC 122 | Facility: CLINIC | Age: 75
Setting detail: OPHTHALMOLOGY
End: 2025-04-22
Payer: MEDICARE

## 2025-04-22 DIAGNOSIS — H35.40: ICD-10-CM

## 2025-04-22 DIAGNOSIS — H40.1131: ICD-10-CM

## 2025-04-22 DIAGNOSIS — L30.8: ICD-10-CM

## 2025-04-22 DIAGNOSIS — H01.001: ICD-10-CM

## 2025-04-22 DIAGNOSIS — H16.223: ICD-10-CM

## 2025-04-22 DIAGNOSIS — Z96.1: ICD-10-CM

## 2025-04-22 DIAGNOSIS — H01.004: ICD-10-CM

## 2025-04-22 DIAGNOSIS — H26.492: ICD-10-CM

## 2025-04-22 DIAGNOSIS — H26.491: ICD-10-CM

## 2025-04-22 DIAGNOSIS — H52.4: ICD-10-CM

## 2025-04-22 DIAGNOSIS — H26.493: ICD-10-CM

## 2025-04-22 PROCEDURE — 92012 INTRM OPH EXAM EST PATIENT: CPT | Performed by: OPHTHALMOLOGY

## 2025-04-22 PROCEDURE — 92015 DETERMINE REFRACTIVE STATE: CPT | Performed by: OPHTHALMOLOGY

## 2025-04-22 PROCEDURE — 92134 CPTRZ OPH DX IMG PST SGM RTA: CPT | Performed by: OPHTHALMOLOGY

## 2025-04-22 ASSESSMENT — REFRACTION_MANIFEST
OS_SPHERE: -2.00
OS_ADD: +2.75
OS_AXIS: 110
OS_AXIS: 15
OS_ADD: +2.75
OD_ADD: +2.75
OS_VA1: 20/25
OD_ADD: +2.75
OS_VA1: 20/20
OS_CYLINDER: +0.50
OS_VA1: 20/20
OD_VA1: 20/20
OD_AXIS: 170
OD_ADD: +2.75
OS_SPHERE: -1.50
OD_SPHERE: +0.75
OS_CYLINDER: SPH
OS_AXIS: 180
OS_VA1: 20/20
OS_CYLINDER: -0.50
OS_VA1: 20/20
OS_SPHERE: -2.25
OS_CYLINDER: -0.50
OD_CYLINDER: -0.75
OD_AXIS: 170
OD_VA1: 20/20
OD_SPHERE: +0.75
OD_SPHERE: +0.75
OD_VA1: 20/25
OS_SPHERE: -8.25
OS_ADD: +2.75
OD_CYLINDER: -0.75
OS_SPHERE: -1.75
OS_CYLINDER: -0.75
OS_ADD: +2.50
OD_AXIS: 170
OD_CYLINDER: -0.75
OS_SPHERE: -1.75
OD_SPHERE: +0.75
OS_ADD: +2.75
OS_CYLINDER: -0.75
OS_SPHERE: -2.00
OS_CYLINDER: -0.75
OS_CYLINDER: SPH
OS_VA1: 20/25-
OS_SPHERE: -2.00
OS_AXIS: 15
OS_AXIS: 10
OD_AXIS: 150
OD_CYLINDER: +1.00
OD_CYLINDER: -1.00
OS_VA1: 20/40
OD_SPHERE: +1.00
OD_CYLINDER: -0.75
OS_AXIS: 15
OD_ADD: +2.50
OD_VA1: 20/25
OD_CYLINDER: -1.00
OD_AXIS: 180
OD_AXIS: 100
OD_VA1: 20/25
OS_VA1: 20/25
OD_VA1: 20/20
OD_SPHERE: -7.50
OS_ADD: +2.75
OD_ADD: +2.75
OD_AXIS: 160
OD_SPHERE: +0.75
OD_ADD: +2.75

## 2025-04-22 ASSESSMENT — REFRACTION_CURRENTRX
OD_AXIS: 170
OS_AXIS: 177
OD_CYLINDER: -1.00
OD_OVR_VA: 20/
OS_SPHERE: -1.75
OS_ADD: +2.25
OD_VPRISM_DIRECTION: PROGS
OD_ADD: +2.25
OS_OVR_VA: 20/
OD_OVR_VA: 20/
OD_AXIS: 171
OD_ADD: +2.50
OS_CYLINDER: -0.50
OS_CYLINDER: -0.75
OD_SPHERE: +0.75
OS_AXIS: 5
OS_OVR_VA: 20/
OD_SPHERE: +0.75
OS_SPHERE: -2.00
OS_VPRISM_DIRECTION: PROGS
OD_CYLINDER: -1.00
OS_ADD: +2.50

## 2025-04-22 ASSESSMENT — KERATOMETRY
OS_AXISANGLE_DEGREES: 79
OS_K1POWER_DIOPTERS: 44.25
OS_K2POWER_DIOPTERS: 45.00
OD_AXISANGLE_DEGREES: 86
OD_K2POWER_DIOPTERS: 44.50
OD_K1POWER_DIOPTERS: 43.75

## 2025-04-22 ASSESSMENT — PACHYMETRY
OS_CT_UM: 598
OD_CT_CORRECTION: -4
OD_CT_UM: 608
OS_CT_CORRECTION: -4

## 2025-04-22 ASSESSMENT — TEAR BREAK UP TIME (TBUT)
OS_TBUT: 2+
OD_TBUT: 2+

## 2025-04-22 ASSESSMENT — TONOMETRY: OD_IOP_MMHG: 18

## 2025-04-22 ASSESSMENT — REFRACTION_AUTOREFRACTION
OD_AXIS: 1365
OS_SPHERE: -1.75
OD_CYLINDER: -0.75
OS_CYLINDER: SPH
OD_SPHERE: +0.75

## 2025-04-22 ASSESSMENT — LID EXAM ASSESSMENTS
OD_BLEPHARITIS: RUL 2+
OS_BLEPHARITIS: LUL 2+

## 2025-04-22 ASSESSMENT — VISUAL ACUITY
OS_BCVA: 20/20
OD_BCVA: 20/25+2

## 2025-07-16 ENCOUNTER — RESULT REVIEW (OUTPATIENT)
Age: 75
End: 2025-07-16

## 2025-07-16 ENCOUNTER — APPOINTMENT (OUTPATIENT)
Dept: NEPHROLOGY | Facility: CLINIC | Age: 75
End: 2025-07-16
Payer: MEDICARE

## 2025-07-16 VITALS
HEIGHT: 68 IN | SYSTOLIC BLOOD PRESSURE: 130 MMHG | HEART RATE: 71 BPM | WEIGHT: 181 LBS | OXYGEN SATURATION: 99 % | BODY MASS INDEX: 27.43 KG/M2 | DIASTOLIC BLOOD PRESSURE: 78 MMHG

## 2025-07-16 PROCEDURE — G2211 COMPLEX E/M VISIT ADD ON: CPT

## 2025-07-16 PROCEDURE — 99214 OFFICE O/P EST MOD 30 MIN: CPT

## 2025-07-17 RX ORDER — SODIUM POLYSTYRENE SULFONATE 4.1 MEQ/G
POWDER, FOR SUSPENSION ORAL; RECTAL
Qty: 45 | Refills: 0 | Status: ACTIVE | COMMUNITY
Start: 2025-07-17 | End: 1900-01-01

## 2025-07-18 PROBLEM — E87.5 HYPERKALEMIA: Status: ACTIVE | Noted: 2025-07-17

## 2025-07-22 ENCOUNTER — LABORATORY RESULT (OUTPATIENT)
Age: 75
End: 2025-07-22

## 2025-07-22 DIAGNOSIS — N39.0 URINARY TRACT INFECTION, SITE NOT SPECIFIED: ICD-10-CM

## 2025-07-22 LAB
ANION GAP SERPL CALC-SCNC: 15 MMOL/L
APPEARANCE: CLEAR
BACTERIA: NEGATIVE /HPF
BILIRUBIN URINE: ABNORMAL
BLOOD URINE: NEGATIVE
BUN SERPL-MCNC: 14 MG/DL
CALCIUM SERPL-MCNC: 9.9 MG/DL
CAST: 1 /LPF
CHLORIDE SERPL-SCNC: 101 MMOL/L
CHOLEST SERPL-MCNC: 201 MG/DL
CO2 SERPL-SCNC: 22 MMOL/L
COARSE GRANULAR CASTS: PRESENT
COLOR: NORMAL
CREAT SERPL-MCNC: 1.26 MG/DL
EGFRCR SERPLBLD CKD-EPI 2021: 60 ML/MIN/1.73M2
EPITHELIAL CELLS: 2 /HPF
GLUCOSE QUALITATIVE U: NEGATIVE MG/DL
GLUCOSE SERPL-MCNC: 95 MG/DL
HDLC SERPL-MCNC: 44 MG/DL
HYALINE CASTS: PRESENT
KETONES URINE: ABNORMAL MG/DL
LDLC SERPL-MCNC: 137 MG/DL
LEUKOCYTE ESTERASE URINE: ABNORMAL
Lab: PRESENT
MICROSCOPIC-UA: NORMAL
NITRITE URINE: NEGATIVE
NONHDLC SERPL-MCNC: 157 MG/DL
PH URINE: 5.5
POTASSIUM SERPL-SCNC: 4.1 MMOL/L
POTASSIUM SERPL-SCNC: 4.2 MMOL/L
PROTEIN URINE: NORMAL MG/DL
RED BLOOD CELLS URINE: NORMAL /HPF
REVIEW: NORMAL
SODIUM SERPL-SCNC: 138 MMOL/L
SPECIFIC GRAVITY URINE: 1.02
TRANSITIONAL EPITHELIAL CELLS: PRESENT
TRIGL SERPL-MCNC: 109 MG/DL
UROBILINOGEN URINE: 0.2 MG/DL
WHITE BLOOD CELLS URINE: 7 /HPF

## 2025-08-05 DIAGNOSIS — E78.5 HYPERLIPIDEMIA, UNSPECIFIED: ICD-10-CM

## 2025-08-13 ENCOUNTER — OFFICE (OUTPATIENT)
Dept: URBAN - METROPOLITAN AREA CLINIC 122 | Facility: CLINIC | Age: 75
Setting detail: OPHTHALMOLOGY
End: 2025-08-13
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
## 2025-08-13 PROCEDURE — NO SHOW FE NO SHOW FEE: Performed by: OPHTHALMOLOGY
